# Patient Record
(demographics unavailable — no encounter records)

---

## 2025-01-27 NOTE — DISCUSSION/SUMMARY
[EKG obtained to assist in diagnosis and management of assessed problem(s)] : EKG obtained to assist in diagnosis and management of assessed problem(s) [FreeTextEntry1] : 1.  Chest discomfort: EKG reviewed normal sinus rhythm nonspecific ST-T wave abnormalities.  Will obtain a CTA of the coronaries and advise once results are known.  Echocardiogram 2.  Carotid bruit: Carotid

## 2025-01-27 NOTE — PHYSICAL EXAM

## 2025-01-27 NOTE — HISTORY OF PRESENT ILLNESS
[FreeTextEntry1] : 58-year-old female with past medical history of GERD comes in for evaluation of chest discomfort.  Unfortunately patient lost a close friend to a fatal MI now has been having substernal nonradiating chest discomfort worse when eating.  No exacerbation of symptoms upon exercise or physical activity nor associated with shortness of breath PND orthopnea or palpitations.

## 2025-03-19 NOTE — PHYSICAL EXAM

## 2025-03-19 NOTE — HISTORY OF PRESENT ILLNESS
[FreeTextEntry1] : 58-year-old female with a past medical history of hyperlipidemia comes in for follow-up of chest discomfort.  Patient continues to suffer from a substernal chest discomfort and nausea.  Did undergo an ETT which was equivocal.  She was denied for a CTA of the coronaries due to the facility being nonpar with her insurance. Plan.  Denies shortness of breath PND orthopnea.

## 2025-03-19 NOTE — DISCUSSION/SUMMARY
[FreeTextEntry1] : 1.  Chest discomfort: EKG; will reattempt authorization at a different imaging facility for a CTA of the coronaries with FFR.  Advised to go to emergency room if pain worsens or recurs. 2.  Hyperlipidemia: Continue statin 3.  GERD: Continue PPI and H2 blocker. [EKG obtained to assist in diagnosis and management of assessed problem(s)] : EKG obtained to assist in diagnosis and management of assessed problem(s)